# Patient Record
Sex: FEMALE | Race: WHITE | Employment: FULL TIME | URBAN - METROPOLITAN AREA
[De-identification: names, ages, dates, MRNs, and addresses within clinical notes are randomized per-mention and may not be internally consistent; named-entity substitution may affect disease eponyms.]

---

## 2018-09-14 ENCOUNTER — APPOINTMENT (OUTPATIENT)
Dept: CT IMAGING | Age: 48
End: 2018-09-14
Attending: EMERGENCY MEDICINE
Payer: COMMERCIAL

## 2018-09-14 ENCOUNTER — HOSPITAL ENCOUNTER (EMERGENCY)
Age: 48
Discharge: HOME | End: 2018-09-14
Attending: EMERGENCY MEDICINE
Payer: COMMERCIAL

## 2018-09-14 VITALS
OXYGEN SATURATION: 99 % | SYSTOLIC BLOOD PRESSURE: 154 MMHG | HEART RATE: 74 BPM | WEIGHT: 261 LBS | DIASTOLIC BLOOD PRESSURE: 93 MMHG | RESPIRATION RATE: 16 BRPM | TEMPERATURE: 97.8 F | BODY MASS INDEX: 43.49 KG/M2 | HEIGHT: 65 IN

## 2018-09-14 DIAGNOSIS — R51.9 INTRACTABLE HEADACHE, UNSPECIFIED CHRONICITY PATTERN, UNSPECIFIED HEADACHE TYPE: Primary | ICD-10-CM

## 2018-09-14 LAB
ALBUMIN SERPL-MCNC: 3.8 G/DL (ref 3.5–5)
ALBUMIN/GLOB SERPL: 1.1 {RATIO}
ALP SERPL-CCNC: 57 U/L (ref 50–130)
ALT SERPL-CCNC: 28 U/L (ref 12–65)
ANION GAP SERPL CALC-SCNC: 10 MMOL/L
AST SERPL-CCNC: 16 U/L (ref 15–37)
BASOPHILS # BLD: 0 K/UL (ref 0–0.2)
BASOPHILS NFR BLD: 0 % (ref 0–2)
BILIRUB SERPL-MCNC: 0.4 MG/DL (ref 0.2–1.1)
BUN SERPL-MCNC: 11 MG/DL (ref 6–23)
CALCIUM SERPL-MCNC: 8.8 MG/DL (ref 8.3–10.4)
CHLORIDE SERPL-SCNC: 106 MMOL/L (ref 98–107)
CO2 SERPL-SCNC: 26 MMOL/L (ref 21–32)
CREAT SERPL-MCNC: 0.74 MG/DL (ref 0.6–1)
DIFFERENTIAL METHOD BLD: ABNORMAL
EOSINOPHIL # BLD: 0.1 K/UL (ref 0–0.8)
EOSINOPHIL NFR BLD: 1 % (ref 0.5–7.8)
ERYTHROCYTE [DISTWIDTH] IN BLOOD BY AUTOMATED COUNT: 13.8 %
GLOBULIN SER CALC-MCNC: 3.6 G/DL (ref 2.3–3.5)
GLUCOSE SERPL-MCNC: 127 MG/DL (ref 65–100)
HCT VFR BLD AUTO: 37.4 % (ref 35.8–46.3)
HGB BLD-MCNC: 12.2 G/DL (ref 11.7–15.4)
IMM GRANULOCYTES # BLD: 0 K/UL (ref 0–0.5)
IMM GRANULOCYTES NFR BLD AUTO: 0 % (ref 0–5)
LYMPHOCYTES # BLD: 1.7 K/UL (ref 0.5–4.6)
LYMPHOCYTES NFR BLD: 15 % (ref 13–44)
MCH RBC QN AUTO: 27.7 PG (ref 26.1–32.9)
MCHC RBC AUTO-ENTMCNC: 32.6 G/DL (ref 31.4–35)
MCV RBC AUTO: 85 FL (ref 79.6–97.8)
MONOCYTES # BLD: 0.6 K/UL (ref 0.1–1.3)
MONOCYTES NFR BLD: 5 % (ref 4–12)
NEUTS SEG # BLD: 9.1 K/UL (ref 1.7–8.2)
NEUTS SEG NFR BLD: 79 % (ref 43–78)
NRBC # BLD: 0 K/UL (ref 0–0.2)
PLATELET # BLD AUTO: 205 K/UL (ref 150–450)
PMV BLD AUTO: 11.3 FL (ref 9.4–12.3)
POTASSIUM SERPL-SCNC: 3.9 MMOL/L (ref 3.5–5.1)
PROT SERPL-MCNC: 7.4 G/DL
RBC # BLD AUTO: 4.4 M/UL (ref 4.05–5.2)
SODIUM SERPL-SCNC: 142 MMOL/L (ref 136–145)
WBC # BLD AUTO: 11.5 K/UL (ref 4.3–11.1)

## 2018-09-14 PROCEDURE — 80053 COMPREHEN METABOLIC PANEL: CPT

## 2018-09-14 PROCEDURE — 96375 TX/PRO/DX INJ NEW DRUG ADDON: CPT | Performed by: EMERGENCY MEDICINE

## 2018-09-14 PROCEDURE — 74011636320 HC RX REV CODE- 636/320: Performed by: EMERGENCY MEDICINE

## 2018-09-14 PROCEDURE — 99283 EMERGENCY DEPT VISIT LOW MDM: CPT | Performed by: EMERGENCY MEDICINE

## 2018-09-14 PROCEDURE — 96361 HYDRATE IV INFUSION ADD-ON: CPT | Performed by: EMERGENCY MEDICINE

## 2018-09-14 PROCEDURE — 74011250636 HC RX REV CODE- 250/636: Performed by: EMERGENCY MEDICINE

## 2018-09-14 PROCEDURE — 74011250637 HC RX REV CODE- 250/637: Performed by: EMERGENCY MEDICINE

## 2018-09-14 PROCEDURE — 85025 COMPLETE CBC W/AUTO DIFF WBC: CPT

## 2018-09-14 PROCEDURE — 70450 CT HEAD/BRAIN W/O DYE: CPT

## 2018-09-14 PROCEDURE — 70496 CT ANGIOGRAPHY HEAD: CPT

## 2018-09-14 PROCEDURE — 96374 THER/PROPH/DIAG INJ IV PUSH: CPT | Performed by: EMERGENCY MEDICINE

## 2018-09-14 RX ORDER — BUTALBITAL, ACETAMINOPHEN AND CAFFEINE 300; 40; 50 MG/1; MG/1; MG/1
1 CAPSULE ORAL
Qty: 20 CAP | Refills: 0 | Status: SHIPPED | OUTPATIENT
Start: 2018-09-14 | End: 2018-09-18

## 2018-09-14 RX ORDER — KETOROLAC TROMETHAMINE 30 MG/ML
15 INJECTION, SOLUTION INTRAMUSCULAR; INTRAVENOUS
Status: COMPLETED | OUTPATIENT
Start: 2018-09-14 | End: 2018-09-14

## 2018-09-14 RX ORDER — PROCHLORPERAZINE EDISYLATE 5 MG/ML
5 INJECTION INTRAMUSCULAR; INTRAVENOUS
Status: COMPLETED | OUTPATIENT
Start: 2018-09-14 | End: 2018-09-14

## 2018-09-14 RX ORDER — DIPHENHYDRAMINE HYDROCHLORIDE 50 MG/ML
12.5 INJECTION, SOLUTION INTRAMUSCULAR; INTRAVENOUS
Status: DISCONTINUED | OUTPATIENT
Start: 2018-09-14 | End: 2018-09-14

## 2018-09-14 RX ORDER — DIPHENHYDRAMINE HCL 25 MG
25 CAPSULE ORAL
Status: COMPLETED | OUTPATIENT
Start: 2018-09-14 | End: 2018-09-14

## 2018-09-14 RX ORDER — PROMETHAZINE HYDROCHLORIDE 25 MG/1
25 TABLET ORAL
Qty: 12 TAB | Refills: 0 | Status: SHIPPED | OUTPATIENT
Start: 2018-09-14

## 2018-09-14 RX ADMIN — IOPAMIDOL 80 ML: 755 INJECTION, SOLUTION INTRAVENOUS at 15:27

## 2018-09-14 RX ADMIN — DIPHENHYDRAMINE HYDROCHLORIDE 25 MG: 25 CAPSULE ORAL at 15:18

## 2018-09-14 RX ADMIN — PROCHLORPERAZINE EDISYLATE 5 MG: 5 INJECTION INTRAMUSCULAR; INTRAVENOUS at 15:15

## 2018-09-14 RX ADMIN — KETOROLAC TROMETHAMINE 15 MG: 30 INJECTION, SOLUTION INTRAMUSCULAR at 15:17

## 2018-09-14 RX ADMIN — SODIUM CHLORIDE 1000 ML: 900 INJECTION, SOLUTION INTRAVENOUS at 15:15

## 2018-09-14 NOTE — ED PROVIDER NOTES
HPI Comments: 50-year-old female with a history of cerebral venous thrombosis presents with severe diffuse headache since 8 AM.  She states it started off mild and gradual this morning and then around 12, suddenly became severe. She states it feels similar to when she had the cerebral venous sinus thrombosis in 2013. She was anticoagulated for 6 months. She had no underlying coagulation disorder. Episode occurred 10 days after back surgery. She denies any focal numbness or weakness. She's had no fevers, chills, neck stiffness. She has nausea without vomiting. She tried Tylenol without improvement. she reports photophobia and generalized weakness. She states her legs feel like \"noodles. \" 
 
Patient is a 53 y.o. female presenting with headaches. The history is provided by the patient. Headache Associated symptoms include nausea. Pertinent negatives include no fever, no palpitations, no shortness of breath, no weakness, no dizziness and no vomiting. Past Medical History:  
Diagnosis Date  History of cerebral venous sinus thrombosis 7/2013  History of laminectomy 6/2013  Morbid obesity with BMI of 40.0-44.9, adult ([facility] [address] Past Surgical History:  
Procedure Laterality Date  HX BREAST LUMPECTOMY Right   
 right  HX CHOLECYSTECTOMY  HX ORTHOPAEDIC  6/28/2013 Discectomy Family History:  
Problem Relation Age of Onset  Diabetes Mother  Hypertension Mother  Diabetes Father  Heart Disease Father  Hypertension Father Social History Social History  Marital status:  Spouse name: N/A  
 Number of children: N/A  
 Years of education: N/A Occupational History  Works at [employer] Social History Main Topics  Smoking status: Never Smoker  Smokeless tobacco: Never Used  Alcohol use No  
 Drug use: No  
 Sexual activity: Not on file Other Topics Concern  Not on file Social History Narrative ALLERGIES: Pcn [penicillins] Review of Systems Constitutional: Negative for chills and fever. HENT: Negative for hearing loss. Eyes: Positive for photophobia. Negative for visual disturbance. Respiratory: Negative for cough and shortness of breath. Cardiovascular: Negative for chest pain and palpitations. Gastrointestinal: Positive for nausea. Negative for abdominal pain, diarrhea and vomiting. Genitourinary: Negative for dysuria. Musculoskeletal: Negative for back pain. Skin: Negative for rash. Neurological: Positive for light-headedness and headaches. Negative for dizziness, weakness and numbness. Psychiatric/Behavioral: Negative for confusion. Vitals:  
 09/14/18 1351 BP: 137/90 Pulse: 82 Resp: 16 Temp: 97.8 °F (36.6 °C) SpO2: 100% Weight: 118.4 kg (261 lb) Height: 5' 5\" (1.651 m) Physical Exam  
Constitutional: She is oriented to person, place, and time. She appears well-developed and well-nourished. Appears in pain HENT:  
Head: Normocephalic and atraumatic. Right Ear: External ear normal.  
Left Ear: External ear normal.  
Nose: Nose normal.  
Mouth/Throat: Oropharynx is clear and moist.  
No Facial tenderness Eyes: Conjunctivae are normal. Pupils are equal, round, and reactive to light. Neck: Normal range of motion. Neck supple. Cardiovascular: Regular rhythm, normal heart sounds and intact distal pulses. Pulmonary/Chest: Effort normal and breath sounds normal. No respiratory distress. She has no wheezes. Abdominal: Soft. Bowel sounds are normal. She exhibits no distension. There is no tenderness. Musculoskeletal: Normal range of motion. She exhibits no edema. Neurological: She is alert and oriented to person, place, and time. No cranial nerve deficit. She exhibits normal muscle tone. Skin: Skin is warm and dry. Psychiatric: Judgment normal.  
Nursing note and vitals reviewed.  
  
 
LUBA 
 Number of Diagnoses or Management Options Diagnosis management comments: Parts of this document were created using dragon voice recognition software. The chart has been reviewed but errors may still be present. noncontrast CT normal.  We'll obtain CT venogram. 
 
4:38 PM 
Report of CT venogram is normal.  Headache improved. We'll discharge home. I discussed the results of all labs, procedures, radiographs, and treatments with the patient and available family. Treatment plan is agreed upon and the patient is ready for discharge. Questions about treatment in the ED and differential diagnosis of presenting condition were answered. Patient was given verbal discharge instructions including, but not limited to, importance of returning to the emergency department for any concern of worsening or continued symptoms. Instructions were given to follow up with a primary care provider or specialist within 1-2 days. Adverse effects of medications, if prescribed, were discussed and patient was advised to refrain from significant physical activity until followed up by primary care physician and to not drive or operate heavy machinery after taking any sedating substances. Amount and/or Complexity of Data Reviewed Clinical lab tests: ordered and reviewed (Results for orders placed or performed during the hospital encounter of 09/14/18 
-CBC WITH AUTOMATED DIFF Result                                            Value                         Ref Range WBC                                               11.5 (H)                      4.3 - 11.1 K/uL           
     RBC                                               4.40                          4.05 - 5.2 M/uL HGB                                               12.2                          11.7 - 15.4 g/dL      HCT                                               37.4 35.8 - 46.3 % MCV                                               85.0                          79.6 - 97.8 FL            
     MCH                                               27.7                          26.1 - 32.9 PG            
     MCHC                                              32.6                          31.4 - 35.0 g/dL RDW                                               13.8                          % PLATELET                                          205                           150 - 450 K/uL MPV                                               11.3                          9.4 - 12.3 FL ABSOLUTE NRBC                                     0.00                          0.0 - 0.2 K/uL            
     DF                                                AUTOMATED NEUTROPHILS                                       79 (H)                        43 - 78 % LYMPHOCYTES                                       15                            13 - 44 % MONOCYTES                                         5                             4.0 - 12.0 % EOSINOPHILS                                       1                             0.5 - 7.8 % BASOPHILS                                         0                             0.0 - 2.0 % IMMATURE GRANULOCYTES                             0                             0.0 - 5.0 %               
     ABS. NEUTROPHILS                                  9.1 (H)                       1.7 - 8.2 K/UL            
     ABS. LYMPHOCYTES                                  1.7                           0.5 - 4.6 K/UL            
     ABS. MONOCYTES                                    0.6                           0.1 - 1.3 K/UL ABS. EOSINOPHILS                                  0.1                           0.0 - 0.8 K/UL            
     ABS. BASOPHILS                                    0.0                           0.0 - 0.2 K/UL            
     ABS. IMM. GRANS.                                  0.0                           0.0 - 0.5 K/UL            
-METABOLIC PANEL, COMPREHENSIVE Result                                            Value                         Ref Range Sodium                                            142                           136 - 145 mmol/L Potassium                                         3.9                           3.5 - 5.1 mmol/L Chloride                                          106                           98 - 107 mmol/L           
     CO2                                               26                            21 - 32 mmol/L Anion gap                                         10                            mmol/L Glucose                                           127 (H)                       65 - 100 mg/dL BUN                                               11                            6 - 23 MG/DL Creatinine                                        0.74                          0.6 - 1.0 MG/DL           
     GFR est AA                                        >60                           >60 ml/min/1.73m2 GFR est non-AA                                    >60                           ml/min/1.73m2 Calcium                                           8.8                           8.3 - 10.4 MG/DL      Bilirubin, total                                  0.4                           0.2 - 1.1 MG/DL           
     ALT (SGPT)                                        28                            12 - 65 U/L               
 AST (SGOT)                                        16                            15 - 37 U/L Alk. phosphatase                                  57                            50 - 130 U/L Protein, total                                    7.4                           g/dL Albumin                                           3.8                           3.5 - 5.0 g/dL Globulin                                          3.6 (H)                       2.3 - 3.5 g/dL A-G Ratio                                         1.1                                                     
) 
Tests in the radiology section of CPT®: ordered and reviewed (Ct Head Wo Cont Result Date: 9/14/2018 CT HEAD WITHOUT CONTRAST. INDICATION: Frontal headache. 2013 sinus thrombosis on the right. COMPARISON: 6, 7, 8 July 2018. TECHNIQUE:   5 mm axial scans from the skull base to the vertex. Our CT scanners use one or more of the following:  Automated exposure control, adjustment of the mA and or kV according to patient size, iterative reconstruction. FINDINGS:  No acute intraparenchymal hemorrhage or abnormal extra-axial fluid collection. The ventricles are normal size. No midline shift or mass effect. Included portion of the paranasal sinuses and orbits grossly unremarkable. IMPRESSION:  Negative for acute intracranial abnormality on this noncontrast exam.  
 
Cta Head Result Date: 9/14/2018 CT venogram of the head, 9/14/2018. INDICATION: Headache that began this morning. Headache with sudden onset with nausea and weak leg COMPARISON: 7/7/2013 Contrast: 100 cc [id number] FINDINGS: Axial images were performed with a venous phase through the head. Multiplanar reformatting as well as 3-D reformatting were performed. Automatic dose reduction protocol was used.  As compared to the previous study today's exam shows a patent the superior sagittal sinus. The transverse sinuses are patent bilaterally. This is in the comparison to the previous study where the right transverse sinus was nonopacified. Upon review of the source images no gross abnormalities are seen. No masses are seen. At bony windows no gross abnormalities are identified. IMPRESSION: Patent dural venous sinuses of the brain. No evidence of dural venous sinus thrombosis. ) Tests in the medicine section of CPT®: reviewed and ordered ED Course Procedures

## 2018-09-14 NOTE — ED TRIAGE NOTES
Pt reports sudden HA today while going to work,  States she has hx of blood clot in her brain in 2013,  Off and on HA since with nausea but reports this feels different,  Reports weakness and near syncopal episode

## 2018-09-14 NOTE — DISCHARGE INSTRUCTIONS

## 2019-01-01 NOTE — ED NOTES
I have reviewed discharge instructions with the patient. The patient verbalized understanding. Patient left ED via Discharge Method: ambulatory to Home with family Opportunity for questions and clarification provided. Patient given 2 scripts called into [facility] 
 
 
 
To continue your aftercare when you leave the hospital, you may receive an automated call from our care team to check in on how you are doing. This is a free service and part of our promise to provide the best care and service to meet your aftercare needs.  If you have questions, or wish to unsubscribe from this service please call [phone]. Thank you for Choosing our [facility] Emergency Department. Yes

## 2021-12-01 LAB
AVERAGE GLUCOSE: NORMAL
CHOLESTEROL, TOTAL: NORMAL
CHOLESTEROL/HDL RATIO: NORMAL
GLUCOSE BLD-MCNC: NORMAL MG/DL
HBA1C MFR BLD: 5.6 %
HDLC SERPL-MCNC: NORMAL MG/DL
LDL CHOLESTEROL CALCULATED: NORMAL
NONHDLC SERPL-MCNC: NORMAL MG/DL
TRIGL SERPL-MCNC: NORMAL MG/DL
VLDLC SERPL CALC-MCNC: NORMAL MG/DL

## 2022-09-16 ENCOUNTER — TRANSCRIBE ORDERS (OUTPATIENT)
Dept: SCHEDULING | Age: 52
End: 2022-09-16

## 2022-09-16 DIAGNOSIS — Z12.31 SCREENING MAMMOGRAM FOR HIGH-RISK PATIENT: Primary | ICD-10-CM

## 2022-09-21 ENCOUNTER — HOSPITAL ENCOUNTER (OUTPATIENT)
Dept: MAMMOGRAPHY | Age: 52
Discharge: HOME | End: 2022-09-24
Payer: COMMERCIAL

## 2022-09-21 DIAGNOSIS — Z12.31 SCREENING MAMMOGRAM FOR HIGH-RISK PATIENT: ICD-10-CM

## 2022-09-21 PROCEDURE — 77063 BREAST TOMOSYNTHESIS BI: CPT

## 2022-09-28 ENCOUNTER — NURSE TRIAGE (OUTPATIENT)
Dept: OTHER | Facility: CLINIC | Age: 52
End: 2022-09-28

## 2022-09-28 NOTE — RESULT ENCOUNTER NOTE
Your recent mammogram result normal. No mammographic evidence of malignancy.       Recommend mammogram in one year

## 2022-09-28 NOTE — TELEPHONE ENCOUNTER
Received call from [facility] with [employer] Complaint. Subjective: Caller states \"my legs swell\"     Current Symptoms: Merrick Candelario had chronic bilateral leg edema from knees down daily for years. Never been evaluated. States they swell by the end of a work day. When she gets home and gets off of her feet, swelling will resolve. No chest pain or shortness of breath. No numbness or tingling, no weakness. No redness or warmth to touch. Legs aren't painful but her knees hurt, states \"I'm not a little lady\". No liver, kidney or heart failure. Onset: 2 years ago; intermittent    Associated Symptoms: NA    Pain Severity: 0/10; N/A; none    Temperature: no fever     What has been tried: elevation, advil    LMP: NA Pregnant: NA    Recommended disposition: See in Office Within 2 Weeks    Care advice provided, patient verbalizes understanding; denies any other questions or concerns; instructed to call back for any new or worsening symptoms. Patient/Caller agrees with recommended disposition; writer provided warm transfer to [facility] for appointment scheduling    Attention Provider: Thank you for allowing me to participate in the care of your patient. The patient was connected to triage in response to information provided to the ECC/PSC. Please do not respond through this encounter as the response is not directed to a shared pool.         Reason for Disposition   [1] MILD swelling of both ankles (i.e., pedal edema) AND [2] is a chronic symptom (recurrent or ongoing AND present > 4 weeks)    Protocols used: Leg Swelling and Edema-ADULT-AH

## 2022-12-13 LAB — HBA1C MFR BLD: 5.6 % (ref 4.8–5.6)

## 2022-12-14 LAB
CHOLEST SERPL-MCNC: 219 MG/DL (ref 100–199)
CHOLEST/HDLC SERPL: 4.1 RATIO (ref 0–4.4)
GLUCOSE SERPL-MCNC: 106 MG/DL (ref 70–99)
HDLC SERPL-MCNC: 53 MG/DL
LDLC SERPL CALC-MCNC: 147 MG/DL (ref 0–99)
TRIGL SERPL-MCNC: 108 MG/DL (ref 0–149)
VLDLC SERPL CALC-MCNC: 19 MG/DL (ref 5–40)

## 2022-12-22 ENCOUNTER — OFFICE VISIT (OUTPATIENT)
Dept: FAMILY MEDICINE CLINIC | Facility: CLINIC | Age: 52
End: 2022-12-22
Payer: COMMERCIAL

## 2022-12-22 VITALS
DIASTOLIC BLOOD PRESSURE: 86 MMHG | HEART RATE: 77 BPM | SYSTOLIC BLOOD PRESSURE: 140 MMHG | RESPIRATION RATE: 19 BRPM | OXYGEN SATURATION: 100 % | WEIGHT: 293 LBS | TEMPERATURE: 96.7 F | HEIGHT: 67 IN | BODY MASS INDEX: 45.99 KG/M2

## 2022-12-22 DIAGNOSIS — E78.00 HYPERCHOLESTEROLEMIA: ICD-10-CM

## 2022-12-22 DIAGNOSIS — M25.551 CHRONIC RIGHT HIP PAIN: Primary | ICD-10-CM

## 2022-12-22 DIAGNOSIS — R73.01 IMPAIRED FASTING GLUCOSE: ICD-10-CM

## 2022-12-22 DIAGNOSIS — G89.29 CHRONIC RIGHT HIP PAIN: Primary | ICD-10-CM

## 2022-12-22 DIAGNOSIS — R60.0 BILATERAL LOWER EXTREMITY EDEMA: ICD-10-CM

## 2022-12-22 PROCEDURE — 1036F TOBACCO NON-USER: CPT | Performed by: FAMILY MEDICINE

## 2022-12-22 PROCEDURE — G8482 FLU IMMUNIZE ORDER/ADMIN: HCPCS | Performed by: FAMILY MEDICINE

## 2022-12-22 PROCEDURE — 99203 OFFICE O/P NEW LOW 30 MIN: CPT | Performed by: FAMILY MEDICINE

## 2022-12-22 PROCEDURE — 3017F COLORECTAL CA SCREEN DOC REV: CPT | Performed by: FAMILY MEDICINE

## 2022-12-22 PROCEDURE — G8417 CALC BMI ABV UP PARAM F/U: HCPCS | Performed by: FAMILY MEDICINE

## 2022-12-22 PROCEDURE — G8427 DOCREV CUR MEDS BY ELIG CLIN: HCPCS | Performed by: FAMILY MEDICINE

## 2022-12-22 RX ORDER — MELOXICAM 15 MG/1
15 TABLET ORAL DAILY PRN
Qty: 30 TABLET | Refills: 0 | Status: SHIPPED | OUTPATIENT
Start: 2022-12-22

## 2022-12-22 ASSESSMENT — PATIENT HEALTH QUESTIONNAIRE - PHQ9
SUM OF ALL RESPONSES TO PHQ QUESTIONS 1-9: 0
SUM OF ALL RESPONSES TO PHQ QUESTIONS 1-9: 0
2. FEELING DOWN, DEPRESSED OR HOPELESS: 0
SUM OF ALL RESPONSES TO PHQ9 QUESTIONS 1 & 2: 0
SUM OF ALL RESPONSES TO PHQ QUESTIONS 1-9: 0
1. LITTLE INTEREST OR PLEASURE IN DOING THINGS: 0
SUM OF ALL RESPONSES TO PHQ QUESTIONS 1-9: 0

## 2022-12-22 ASSESSMENT — ENCOUNTER SYMPTOMS
COUGH: 0
DIARRHEA: 0
SHORTNESS OF BREATH: 0
CONSTIPATION: 0
VOMITING: 0

## 2022-12-22 NOTE — PROGRESS NOTES
Janell Miles (:  1970) is a 47 y.o. female,Established patient, here for evaluation of the following chief complaint(s):  New Patient (Est Care. Swelling in both legs and pain in the right knee sometimes. Right hip pain when she lays on it.) and Other (Had a PAP done in  and was ok. Has a Cologuard in  and is was ok. Has been over 10 years since the last Tdap shot. Has never had the Shingles vac.)         ASSESSMENT/PLAN:  1. Chronic right hip pain  -     meloxicam (MOBIC) 15 MG tablet; Take 1 tablet by mouth daily as needed for Pain, Disp-30 tablet, R-0Normal  2. Hypercholesterolemia  3. Body mass index (BMI) 45.0-49.9, adult ([facility] [address]  4. Impaired fasting glucose  Trial nsaid for hip pains. Reviewed labs she brought with her. Discussed weight loss strategies to include intermittent fasting. F/u in 3 months to see where she is, and can consider pharmacologic methods of weight loss in the future if patient is interested. Suspect leg swelling is due to weight and that if she were to lose weight, this could improve. No concern at this point for DVT. She is not currently on medications that can cause this. No follow-ups on file. Subjective   SUBJECTIVE/OBJECTIVE:  HPI  Janell Miles is a 47 y.o. female  Here to establish care as a new patient. Issues they would like to discuss include:  Chief Complaint   Patient presents with    New Patient     Est Care. Swelling in both legs and pain in the right knee sometimes. Right hip pain when she lays on it. Other     Had a PAP done in  and was ok. Has a Cologuard in  and is was ok. Has been over 10 years since the last Tdap shot. Has never had the Shingles vac. Cholesterol and glucose have crept up along with her weight. Swelling in her bilateral legs for the last 2 years. In the last 6-12 months it has started to concern her. She has a hard time finding compression socks due to the size of her calved.   When she wears ankle socks, her ankles start to swell over the socks. Started wearing her husbands' sock but it is starting to cause an indentation. Does have some spider veins as well. Having more hip pain in the right hip, worse when she lays on the right hip. It is described as aching. Reports her  is very sick and is terrified to go to the doctor and is losing weight and depressed. reports that she has never smoked. She has never been exposed to tobacco smoke. She has never used smokeless tobacco. She reports that she does not drink alcohol.     has no history on file for sexual activity. has a past medical history of Blood clots in brain. Past Surgical History:   Procedure Laterality Date    BACK SURGERY  2013    BREAST CYST EXCISION      CHOLECYSTECTOMY  2004     family history includes Breast Cancer in her mother and paternal grandmother. Current Outpatient Medications   Medication Sig    meloxicam (MOBIC) 15 MG tablet Take 1 tablet by mouth daily as needed for Pain    promethazine (PHENERGAN) 25 MG tablet Take 25 mg by mouth every 6 hours as needed     No current facility-administered medications for this visit. Review of Systems   Constitutional:  Negative for diaphoresis and unexpected weight change. HENT:  Negative for congestion. Eyes:  Negative for visual disturbance. Respiratory:  Negative for cough and shortness of breath. Cardiovascular:  Positive for leg swelling. Negative for chest pain. Gastrointestinal:  Negative for constipation, diarrhea and vomiting. Genitourinary:  Negative for dysuria. Musculoskeletal:  Positive for arthralgias. Neurological:  Negative for headaches. Psychiatric/Behavioral:  Negative for dysphoric mood and sleep disturbance. The patient is not nervous/anxious. Objective   Physical Exam  Vitals reviewed. Constitutional:       Appearance: Normal appearance. She is obese. HENT:      Head: Normocephalic.    Eyes: Extraocular Movements: Extraocular movements intact. Conjunctiva/sclera: Conjunctivae normal.      Pupils: Pupils are equal, round, and reactive to light. Cardiovascular:      Rate and Rhythm: Normal rate and regular rhythm. Heart sounds: Normal heart sounds. No murmur heard. Pulmonary:      Effort: Pulmonary effort is normal. No respiratory distress. Breath sounds: Normal breath sounds. Abdominal:      General: Bowel sounds are normal.      Palpations: Abdomen is soft. Musculoskeletal:         General: Swelling (right ankle>left) present. Normal range of motion. Right hip: No tenderness, bony tenderness or crepitus. Normal range of motion. Left hip: No tenderness, bony tenderness or crepitus. Normal range of motion. Skin:     General: Skin is warm and dry. Neurological:      General: No focal deficit present. Mental Status: She is alert. Psychiatric:         Mood and Affect: Mood normal.         Behavior: Behavior normal.   BP (!) 140/86 (Site: Left Upper Arm, Position: Sitting, Cuff Size: Thigh)   Pulse 77   Temp (!) 96.7 °F (35.9 °C)   Resp 19   Ht 5' 7\" (1.702 m)   Wt (!) 305 lb (138.3 kg)   LMP 11/28/2022   SpO2 100%   BMI 47.77 kg/m²        An electronic signature was used to authenticate this note.     --Landry Blank MD

## 2023-09-29 ENCOUNTER — TRANSCRIBE ORDERS (OUTPATIENT)
Dept: SCHEDULING | Age: 53
End: 2023-09-29

## 2023-09-29 DIAGNOSIS — Z12.31 ENCOUNTER FOR SCREENING MAMMOGRAM FOR BREAST CANCER: Primary | ICD-10-CM

## 2023-10-04 ENCOUNTER — HOSPITAL ENCOUNTER (OUTPATIENT)
Dept: MAMMOGRAPHY | Age: 53
Discharge: HOME | End: 2023-10-07
Payer: COMMERCIAL

## 2023-10-04 VITALS — WEIGHT: 293 LBS | HEIGHT: 67 IN | BODY MASS INDEX: 45.99 KG/M2

## 2023-10-04 DIAGNOSIS — Z12.31 ENCOUNTER FOR SCREENING MAMMOGRAM FOR BREAST CANCER: ICD-10-CM

## 2023-10-04 PROCEDURE — 77063 BREAST TOMOSYNTHESIS BI: CPT

## 2024-08-15 ENCOUNTER — TRANSCRIBE ORDERS (OUTPATIENT)
Dept: SCHEDULING | Age: 54
End: 2024-08-15

## 2024-08-15 DIAGNOSIS — Z12.31 SCREENING MAMMOGRAM FOR HIGH-RISK PATIENT: Primary | ICD-10-CM

## 2024-10-16 ENCOUNTER — HOSPITAL ENCOUNTER (OUTPATIENT)
Dept: MAMMOGRAPHY | Age: 54
Discharge: HOME | End: 2024-10-19
Attending: FAMILY MEDICINE
Payer: COMMERCIAL

## 2024-10-16 VITALS — HEIGHT: 67 IN | BODY MASS INDEX: 45.99 KG/M2 | WEIGHT: 293 LBS

## 2024-10-16 DIAGNOSIS — Z12.31 ENCOUNTER FOR SCREENING MAMMOGRAM FOR HIGH-RISK PATIENT: ICD-10-CM

## 2024-10-16 PROCEDURE — 77067 SCR MAMMO BI INCL CAD: CPT

## 2025-04-03 ENCOUNTER — OFFICE VISIT (OUTPATIENT)
Dept: INTERNAL MEDICINE CLINIC | Facility: CLINIC | Age: 55
End: 2025-04-03
Payer: COMMERCIAL

## 2025-04-03 VITALS
SYSTOLIC BLOOD PRESSURE: 116 MMHG | BODY MASS INDEX: 45.27 KG/M2 | HEART RATE: 71 BPM | DIASTOLIC BLOOD PRESSURE: 72 MMHG | HEIGHT: 67 IN | WEIGHT: 288.4 LBS | OXYGEN SATURATION: 98 %

## 2025-04-03 DIAGNOSIS — I10 PRIMARY HYPERTENSION: ICD-10-CM

## 2025-04-03 DIAGNOSIS — G47.33 OSA (OBSTRUCTIVE SLEEP APNEA): ICD-10-CM

## 2025-04-03 DIAGNOSIS — E55.9 VITAMIN D DEFICIENCY: ICD-10-CM

## 2025-04-03 DIAGNOSIS — E53.8 LOW SERUM VITAMIN B12: ICD-10-CM

## 2025-04-03 DIAGNOSIS — E78.2 MIXED HYPERLIPIDEMIA: ICD-10-CM

## 2025-04-03 DIAGNOSIS — E78.2 MIXED HYPERLIPIDEMIA: Primary | ICD-10-CM

## 2025-04-03 PROBLEM — I89.0 LYMPHEDEMA: Status: ACTIVE | Noted: 2023-09-22

## 2025-04-03 LAB
25(OH)D3 SERPL-MCNC: 17 NG/ML (ref 30–100)
ALBUMIN SERPL-MCNC: 3.7 G/DL (ref 3.5–5)
ALBUMIN/GLOB SERPL: 1 (ref 1–1.9)
ALP SERPL-CCNC: 76 U/L (ref 35–104)
ALT SERPL-CCNC: 20 U/L (ref 8–45)
ANION GAP SERPL CALC-SCNC: 9 MMOL/L (ref 7–16)
AST SERPL-CCNC: 19 U/L (ref 15–37)
BASOPHILS # BLD: 0.05 K/UL (ref 0–0.2)
BASOPHILS NFR BLD: 0.5 % (ref 0–2)
BILIRUB SERPL-MCNC: 0.6 MG/DL (ref 0–1.2)
BUN SERPL-MCNC: 14 MG/DL (ref 6–23)
CALCIUM SERPL-MCNC: 9.6 MG/DL (ref 8.8–10.2)
CHLORIDE SERPL-SCNC: 104 MMOL/L (ref 98–107)
CHOLEST SERPL-MCNC: 223 MG/DL (ref 0–200)
CO2 SERPL-SCNC: 29 MMOL/L (ref 20–29)
CREAT SERPL-MCNC: 0.85 MG/DL (ref 0.6–1.1)
DIFFERENTIAL METHOD BLD: NORMAL
EOSINOPHIL # BLD: 0.14 K/UL (ref 0–0.8)
EOSINOPHIL NFR BLD: 1.3 % (ref 0.5–7.8)
ERYTHROCYTE [DISTWIDTH] IN BLOOD BY AUTOMATED COUNT: 14.4 % (ref 11.9–14.6)
GLOBULIN SER CALC-MCNC: 3.7 G/DL (ref 2.3–3.5)
GLUCOSE SERPL-MCNC: 104 MG/DL (ref 70–99)
HCT VFR BLD AUTO: 42.2 % (ref 35.8–46.3)
HDLC SERPL-MCNC: 63 MG/DL (ref 40–60)
HDLC SERPL: 3.6 (ref 0–5)
HGB BLD-MCNC: 13.4 G/DL (ref 11.7–15.4)
IMM GRANULOCYTES # BLD AUTO: 0.07 K/UL (ref 0–0.5)
IMM GRANULOCYTES NFR BLD AUTO: 0.7 % (ref 0–5)
LDLC SERPL CALC-MCNC: 147 MG/DL (ref 0–100)
LYMPHOCYTES # BLD: 3.33 K/UL (ref 0.5–4.6)
LYMPHOCYTES NFR BLD: 31.4 % (ref 13–44)
MCH RBC QN AUTO: 27.6 PG (ref 26.1–32.9)
MCHC RBC AUTO-ENTMCNC: 31.8 G/DL (ref 31.4–35)
MCV RBC AUTO: 87 FL (ref 82–102)
MONOCYTES # BLD: 0.59 K/UL (ref 0.1–1.3)
MONOCYTES NFR BLD: 5.6 % (ref 4–12)
NEUTS SEG # BLD: 6.43 K/UL (ref 1.7–8.2)
NEUTS SEG NFR BLD: 60.5 % (ref 43–78)
NRBC # BLD: 0 K/UL (ref 0–0.2)
PLATELET # BLD AUTO: 293 K/UL (ref 150–450)
PMV BLD AUTO: 11 FL (ref 9.4–12.3)
POTASSIUM SERPL-SCNC: 4.8 MMOL/L (ref 3.5–5.1)
PROT SERPL-MCNC: 7.4 G/DL (ref 6.3–8.2)
RBC # BLD AUTO: 4.85 M/UL (ref 4.05–5.2)
SODIUM SERPL-SCNC: 141 MMOL/L (ref 136–145)
TRIGL SERPL-MCNC: 68 MG/DL (ref 0–150)
TSH W FREE THYROID IF ABNORMAL: 1.76 UIU/ML (ref 0.27–4.2)
VIT B12 SERPL-MCNC: 337 PG/ML (ref 193–986)
VLDLC SERPL CALC-MCNC: 14 MG/DL (ref 6–23)
WBC # BLD AUTO: 10.6 K/UL (ref 4.3–11.1)

## 2025-04-03 PROCEDURE — 3074F SYST BP LT 130 MM HG: CPT

## 2025-04-03 PROCEDURE — 99214 OFFICE O/P EST MOD 30 MIN: CPT

## 2025-04-03 PROCEDURE — 3078F DIAST BP <80 MM HG: CPT

## 2025-04-03 RX ORDER — ERGOCALCIFEROL 1.25 MG/1
50000 CAPSULE, LIQUID FILLED ORAL WEEKLY
COMMUNITY
Start: 2024-09-19 | End: 2025-04-03

## 2025-04-03 RX ORDER — CYANOCOBALAMIN, ISOPROPYL ALCOHOL 1000MCG/ML
1000 KIT INJECTION WEEKLY
COMMUNITY
Start: 2024-08-15 | End: 2025-04-03

## 2025-04-03 RX ORDER — LISINOPRIL 20 MG/1
20 TABLET ORAL DAILY
COMMUNITY
Start: 2024-08-15 | End: 2025-04-03 | Stop reason: SDUPTHER

## 2025-04-03 RX ORDER — LISINOPRIL 20 MG/1
20 TABLET ORAL DAILY
Qty: 90 TABLET | Refills: 1 | Status: SHIPPED | OUTPATIENT
Start: 2025-04-03 | End: 2025-09-30

## 2025-04-03 SDOH — ECONOMIC STABILITY: FOOD INSECURITY: WITHIN THE PAST 12 MONTHS, THE FOOD YOU BOUGHT JUST DIDN'T LAST AND YOU DIDN'T HAVE MONEY TO GET MORE.: NEVER TRUE

## 2025-04-03 SDOH — ECONOMIC STABILITY: FOOD INSECURITY: WITHIN THE PAST 12 MONTHS, YOU WORRIED THAT YOUR FOOD WOULD RUN OUT BEFORE YOU GOT MONEY TO BUY MORE.: NEVER TRUE

## 2025-04-03 ASSESSMENT — ENCOUNTER SYMPTOMS
SHORTNESS OF BREATH: 0
CHEST TIGHTNESS: 0

## 2025-04-03 ASSESSMENT — PATIENT HEALTH QUESTIONNAIRE - PHQ9
2. FEELING DOWN, DEPRESSED OR HOPELESS: NOT AT ALL
SUM OF ALL RESPONSES TO PHQ QUESTIONS 1-9: 0
1. LITTLE INTEREST OR PLEASURE IN DOING THINGS: NOT AT ALL
SUM OF ALL RESPONSES TO PHQ QUESTIONS 1-9: 0

## 2025-04-03 ASSESSMENT — VISUAL ACUITY: OU: 1

## 2025-04-03 NOTE — PROGRESS NOTES
May- had gone almost a full year without cycles.   HTN- BP adequately controlled on antihypertensive therapy. Tolerating medication well. Pt denies adverse effects of medication, as well as chest pain, dizziness, palpitations, swelling, and headache.   Hot flashes- will flush and then feel hot after. Not taking supplements at this time.     7/25/24:  Felt heart racing that lasted approx 2 minutes. Had just walked in from outside parking lot. No CP or SOB. Has also felt fatigued and needing to take breaks between tasks. Family has told her that she snores/wakes up gasping for air. She denies morning headaches.            Review of Systems   Constitutional:  Negative for chills, fever and unexpected weight change.   Respiratory:  Negative for chest tightness and shortness of breath.    Cardiovascular:  Negative for chest pain and palpitations.   Endocrine: Positive for heat intolerance. Negative for cold intolerance.   Musculoskeletal:  Positive for arthralgias.   Skin:  Negative for rash.   Psychiatric/Behavioral:  Negative for agitation, dysphoric mood, sleep disturbance and suicidal ideas. The patient is not nervous/anxious.       Visit Vitals  /72   Pulse 71   Ht 1.702 m (5' 7\")   Wt 130.8 kg (288 lb 6.4 oz)   SpO2 98%   BMI 45.17 kg/m²     Physical Exam  Vitals and nursing note reviewed.   Constitutional:       Appearance: Normal appearance.   HENT:      Head: Normocephalic.      Right Ear: External ear normal.      Left Ear: External ear normal.   Eyes:      General: Lids are normal. Vision grossly intact.      Extraocular Movements: Extraocular movements intact.      Conjunctiva/sclera: Conjunctivae normal.      Pupils: Pupils are equal, round, and reactive to light.   Neck:      Trachea: Phonation normal.   Cardiovascular:      Rate and Rhythm: Normal rate and regular rhythm.      Heart sounds: Normal heart sounds, S1 normal and S2 normal.   Pulmonary:      Effort: Pulmonary effort is normal.      Breath

## 2025-04-04 ENCOUNTER — RESULTS FOLLOW-UP (OUTPATIENT)
Dept: INTERNAL MEDICINE CLINIC | Facility: CLINIC | Age: 55
End: 2025-04-04

## 2025-04-04 DIAGNOSIS — E55.9 VITAMIN D DEFICIENCY: Primary | ICD-10-CM

## 2025-04-04 RX ORDER — ERGOCALCIFEROL 1.25 MG/1
50000 CAPSULE, LIQUID FILLED ORAL WEEKLY
Qty: 12 CAPSULE | Refills: 1 | Status: SHIPPED | OUTPATIENT
Start: 2025-04-04